# Patient Record
Sex: MALE | Race: WHITE
[De-identification: names, ages, dates, MRNs, and addresses within clinical notes are randomized per-mention and may not be internally consistent; named-entity substitution may affect disease eponyms.]

---

## 2020-07-17 ENCOUNTER — HOSPITAL ENCOUNTER (EMERGENCY)
Dept: HOSPITAL 63 - ER | Age: 40
Discharge: HOME | End: 2020-07-17
Payer: OTHER GOVERNMENT

## 2020-07-17 VITALS — HEIGHT: 73 IN | BODY MASS INDEX: 26.97 KG/M2 | WEIGHT: 203.49 LBS

## 2020-07-17 VITALS — SYSTOLIC BLOOD PRESSURE: 136 MMHG | DIASTOLIC BLOOD PRESSURE: 100 MMHG

## 2020-07-17 DIAGNOSIS — R11.0: ICD-10-CM

## 2020-07-17 DIAGNOSIS — M79.10: Primary | ICD-10-CM

## 2020-07-17 DIAGNOSIS — Z20.828: ICD-10-CM

## 2020-07-17 DIAGNOSIS — R53.81: ICD-10-CM

## 2020-07-17 PROCEDURE — 36415 COLL VENOUS BLD VENIPUNCTURE: CPT

## 2020-07-17 PROCEDURE — 99283 EMERGENCY DEPT VISIT LOW MDM: CPT

## 2020-07-17 NOTE — PHYS DOC
General Adult


EDM:


Chief Complaint:  FEVER





HPI:


HPI:





Patient is a 40-year-old male who presents to the emergency department this 

morning secondary to body aches nausea and general malaise.  He is concerned 

that he has COVID-19.  He states he had fever at home this morning.  He does not

know for sure if he has been exposed to anyone with COVID.  He denies any cough 

congestion shortness of breath or dyspnea on exertion.  []





Review of Systems:


Review of Systems:


Constitutional: Reports fever


Eyes:  Denies change in visual acuity 


HENT:  Denies nasal congestion or sore throat 


Respiratory:  Denies cough or shortness of breath 


Cardiovascular:  Denies chest pain or edema 


GI:  Denies abdominal pain, nausea, vomiting, bloody stools or diarrhea 


: Denies dysuria 


Musculoskeletal:  Denies back pain or joint pain 


Integument:  Denies rash 


Neurologic:  Denies headache, focal weakness or sensory changes 


Endocrine:  Denies polyuria or polydipsia 


Lymphatic:  Denies swollen glands 


Psychiatric: Anxious





Heart Score:


Risk Factors:


Risk Factors:  DM, Current or recent (<one month) smoker, HTN, HLP, family 

history of CAD, obesity.


Risk Scores:


Score 0 - 3:  2.5% MACE over next 6 weeks - Discharge Home


Score 4 - 6:  20.3% MACE over next 6 weeks - Admit for Clinical Observation


Score 7 - 10:  72.7% MACE over next 6 weeks - Early Invasive Strategies





Allergies:


Allergies:





Allergies








Coded Allergies Type Severity Reaction Last Updated Verified


 


  No Known Drug Allergies    7/17/20 No











Physical Exam:


PE:





Constitutional: Well developed, well nourished, no acute distress, non-toxic 

appearance. []


HENT: Normocephalic, atraumatic, bilateral external ears normal, oropharynx 

moist, no oral exudates, nose normal. []


Eyes: PERRLA, EOMI, conjunctiva normal, no discharge. [] 


Neck: Normal range of motion, no tenderness, supple, no stridor. [] 


Cardiovascular:Heart rate regular rhythm, no murmur []


Lungs & Thorax:  Bilateral breath sounds clear to auscultation []


Abdomen: Bowel sounds normal, soft, no tenderness, no masses, no pulsatile 

masses. [] 


Skin: Warm, dry, no erythema, no rash. [] 


Back: No tenderness, no CVA tenderness. [] 


Extremities: No tenderness, no cyanosis, no clubbing, ROM intact, no edema. [] 


Neurologic: Alert and oriented X 3, normal motor function, normal sensory 

function, no focal deficits noted. []


Psychologic: Affect normal, judgement normal, mood normal. []





EKG:


EKG:


[]





Radiology/Procedures:


Radiology/Procedures:


[]





Course & Med Decision Making:


Course & Med Decision Making


Pertinent Labs and Imaging studies reviewed. (See chart for details)





[]





Dragon Disclaimer:


Dragon Disclaimer:


This electronic medical record was generated, in whole or in part, using a voice

 recognition dictation system.





Departure


Departure:


Impression:  


   Primary Impression:  


   Suspected COVID-19 virus infection


Disposition:  01 HOME/RESIDENCE PRIOR TO ADM


Condition:  STABLE


Referrals:  


KASIE ZEE (PCP)


Patient Instructions:  Viral Infections





Additional Instructions:  


Thank you for visiting [facility]. We appreciate you trusting us with 

your care. If any additional problems come up don't hesitate to return to visit 

us. Please follow up with your primary care provider so they can plan additional

 care if needed and know about the problem that you had. If symptoms worsen come

 back to the Emergency Department. Any concerning symptoms that start such as 

chest pain, shortness of air, weakness or numbness on one side of the body, 

running high fevers or any other concerning symptoms return to the ER.


You have a viral syndrome which may include symptoms like muscle aches, fevers, 

chills, runny nose, cough, sneezing, sore throat, vomiting, or diarrhea. One of 

the potential viruses that you may have is SARS-CoV-2, the virus that causes 

COVID-19, also known as the Coronavirus. You are just as likely to have a 

different viral infection such as the common cold, flu, etc. Most patients with 

the Coronavirus have mild symptoms and recover on their own. Resting, staying 

hydrated, and sleep from known cases can be helpful. As of todays visit, you 

are well enough to go home and treat your symptoms with oral fluids and over the

 counter medications. 


Coronavirus testing is not performed on most people with mild symptoms who are b

eing discharged from the emergency department.


If Coronavirus testing was performed the results will not be available for 

possibly up to 2-3 days. If your result is positive you will be contacted. 


Please follow the following precautions at home:


1)   Stay home except to get medical care.


2)   As advised by the CDC we recommend you stay in your home and minimize 

contact with other people. We do not want you to spread the infection. 


3)   Those who are older or have significant medical issues may have more severe

 symptoms from this infection. We recommend self-isolation,FOR AT LEAST 7 DAYS 

after your 1st day of symptoms. AFTER you feel better please wait AT LEAST 

ANOTHER WEEK before returning to regular activities and being around other 

people!


4)   IF you become sicker and have difficulty breathing, chest pain, unable to 

eat/drink, severe vomiting, diarrhea, or weakness you may need to return to the 

Emergency Department.


5)   You should restrict activities outside your home, except for getting 

medical care. DO NOT go to work, school, or public areas. Avoid using public 

transportation, ride sharing, or taxis.


6)   Separate yourself from other people in your home. You should use a separate

 bathroom if possible.


7)   Avoid sharing personal household items such as dishes, cups, eating 

utensils, towels, etc.


8)   Clean all high touch surfaces every day (door knobs, counter tops, etc). 

Use a household cleaning spray or wipe per label instructions.


9)   Clean your hands often. Wash your hands with soap and water for at least 20

 seconds. 


10)   Cover your mouth and nose with a tissue when you cough or sneeze.


11)   Throw used tissues in a trash can and immediately wash your hands. 


For additional resources please visit the CDC website or the [place] [facility] 

[facility] ([phone]).





Justification of Admission:


Justification of Admission:


Justification of Admission Dx:  MARIO Mccarthy DO             Jul 17, 2020 08:06